# Patient Record
Sex: FEMALE | HISPANIC OR LATINO | URBAN - METROPOLITAN AREA
[De-identification: names, ages, dates, MRNs, and addresses within clinical notes are randomized per-mention and may not be internally consistent; named-entity substitution may affect disease eponyms.]

---

## 2019-01-09 ENCOUNTER — OFFICE VISIT (OUTPATIENT)
Dept: URBAN - METROPOLITAN AREA CLINIC 18 | Facility: CLINIC | Age: 39
End: 2019-01-09
Payer: COMMERCIAL

## 2019-01-09 DIAGNOSIS — H33.052 TOTAL RETINAL DETACHMENT, LEFT EYE: ICD-10-CM

## 2019-01-09 DIAGNOSIS — H43.811 VITREOUS DEGENERATION, RIGHT EYE: ICD-10-CM

## 2019-01-09 DIAGNOSIS — H11.153 PINGUECULA, BILATERAL: ICD-10-CM

## 2019-01-09 DIAGNOSIS — H25.042 POSTERIOR SUBCAPSULAR POLAR AGE-RELATED CATARACT, LEFT EYE: Primary | ICD-10-CM

## 2019-01-09 PROCEDURE — 99203 OFFICE O/P NEW LOW 30 MIN: CPT | Performed by: OPTOMETRIST

## 2019-01-09 ASSESSMENT — KERATOMETRY
OD: 41.88
OS: 42.00

## 2019-01-09 ASSESSMENT — INTRAOCULAR PRESSURE
OD: 16
OS: 16

## 2019-01-09 NOTE — IMPRESSION/PLAN
Impression: Posterior subcapsular polar age-related cataract, left eye: H25.042. Plan: Cataracts account for the patient's complaints. No treatment currently recommended. The patient will monitor vision changes and contact us with any decrease in vision.

## 2019-01-09 NOTE — IMPRESSION/PLAN
Impression: Vitreous degeneration, right eye: H43.811. Plan: There is no evidence of retinal pathology. All signs, symptoms, and risks of retinal detachment and tears were discussed in detail. Patient instructed to call the office immediately if any symptoms noted. Recommend the patient return to office yearly for follow up.

## 2019-01-09 NOTE — IMPRESSION/PLAN
Impression: Diagnosis: Total retinal detachment, left eye. Code: W78.171. Long standing Plan: pigmented demarcation line / well sealed inferior.  No treatment at this time

## 2019-01-16 ENCOUNTER — OFFICE VISIT (OUTPATIENT)
Dept: URBAN - METROPOLITAN AREA CLINIC 18 | Facility: CLINIC | Age: 39
End: 2019-01-16
Payer: COMMERCIAL

## 2019-01-16 DIAGNOSIS — H52.03 HYPERMETROPIA, BILATERAL: Primary | ICD-10-CM

## 2019-01-16 PROCEDURE — 92012 INTRM OPH EXAM EST PATIENT: CPT | Performed by: OPTOMETRIST

## 2019-01-16 PROCEDURE — 92015 DETERMINE REFRACTIVE STATE: CPT | Performed by: OPTOMETRIST

## 2019-01-16 ASSESSMENT — VISUAL ACUITY: OD: 20/20

## 2022-10-18 ENCOUNTER — OFFICE VISIT (OUTPATIENT)
Dept: URBAN - METROPOLITAN AREA CLINIC 18 | Facility: CLINIC | Age: 42
End: 2022-10-18
Payer: COMMERCIAL

## 2022-10-18 DIAGNOSIS — H52.03 HYPERMETROPIA, BILATERAL: Primary | ICD-10-CM

## 2022-10-18 DIAGNOSIS — H25.042 POSTERIOR SUBCAPSULAR POLAR AGE-RELATED CATARACT, LEFT EYE: ICD-10-CM

## 2022-10-18 PROCEDURE — 92004 COMPRE OPH EXAM NEW PT 1/>: CPT | Performed by: OPTOMETRIST

## 2022-10-18 ASSESSMENT — INTRAOCULAR PRESSURE
OD: 16
OS: 20

## 2022-10-18 ASSESSMENT — VISUAL ACUITY: OD: 20/20

## 2022-10-18 NOTE — IMPRESSION/PLAN
Impression: Posterior subcapsular polar age-related cataract, left eye: H25.042. Plan: Cataracts account for the patient's complaints. Patient education was discussed regarding cataracts, lens options and surgery. Recommend cataract consult with surgeon. Order A-Scan. Hold off on filling any new glasses prescription until after the consultation. Cataract has been worsening but underlying cause is from eye trauma as a teenager. First visit with [facility] is 2015 patient was 20/300. Patient understands limitation and expectations.

## 2022-10-18 NOTE — IMPRESSION/PLAN
Impression: Hypermetropia, bilateral: H52.03. Plan: Finalized New Glasses RX. Patient education on appropriate options of eye glasses. Patient may wait on outcome of Cataract Sx consult.

## 2022-11-29 ENCOUNTER — OFFICE VISIT (OUTPATIENT)
Dept: URBAN - METROPOLITAN AREA CLINIC 17 | Facility: CLINIC | Age: 42
End: 2022-11-29
Payer: COMMERCIAL

## 2022-11-29 DIAGNOSIS — H25.012 CORTICAL AGE-RELATED CATARACT, LEFT EYE: Primary | ICD-10-CM

## 2022-11-29 PROCEDURE — 99204 OFFICE O/P NEW MOD 45 MIN: CPT | Performed by: OPHTHALMOLOGY

## 2022-11-29 ASSESSMENT — KERATOMETRY
OD: 41.88
OS: 42.13

## 2022-11-29 ASSESSMENT — VISUAL ACUITY
OD: 20/20
OS: CF 3FT

## 2022-11-29 ASSESSMENT — INTRAOCULAR PRESSURE
OD: 15
OS: 14

## 2022-11-29 NOTE — IMPRESSION/PLAN
Impression: Cortical age-related cataract, left eye: H25.012. Condition: established, worsening. Symptoms: could improve with surgery. Plan: Cataract accounts for patient's complaints. Reviewed risks, benefits, and procedure. Patient desires surgery, schedule ce/iol OS, RL2, discussed lens options, distance refractive target, patient is clear for surgery in [place] [id number]. Advised pt visual improvement may be limited due to previous retina detachment. Pt aware and would like to proceed. Recommend Dextenza to avoid noncompliance with drops and to help maintain infection/inflammation.

## 2025-06-19 ENCOUNTER — OFFICE VISIT (OUTPATIENT)
Dept: URBAN - METROPOLITAN AREA CLINIC 18 | Facility: CLINIC | Age: 45
End: 2025-06-19
Payer: COMMERCIAL

## 2025-06-19 DIAGNOSIS — H25.042 POSTERIOR SUBCAPSULAR POLAR AGE-RELATED CATARACT, LEFT EYE: Primary | ICD-10-CM

## 2025-06-19 DIAGNOSIS — H33.052 TOTAL RETINAL DETACHMENT, LEFT EYE: ICD-10-CM

## 2025-06-19 DIAGNOSIS — H16.223 KERATOCONJUNCT SICCA, NOT SPECIFIED AS SJOGREN'S, BILATERAL: ICD-10-CM

## 2025-06-19 PROCEDURE — 99213 OFFICE O/P EST LOW 20 MIN: CPT

## 2025-06-19 ASSESSMENT — INTRAOCULAR PRESSURE
OD: 16
OS: 15

## 2025-08-22 ENCOUNTER — OFFICE VISIT (OUTPATIENT)
Dept: URBAN - METROPOLITAN AREA CLINIC 18 | Facility: CLINIC | Age: 45
End: 2025-08-22
Payer: COMMERCIAL

## 2025-08-22 DIAGNOSIS — H16.223 KERATOCONJUNCT SICCA, NOT SPECIFIED AS SJOGREN'S, BILATERAL: Primary | ICD-10-CM

## 2025-08-22 PROCEDURE — 99213 OFFICE O/P EST LOW 20 MIN: CPT

## 2025-08-22 ASSESSMENT — INTRAOCULAR PRESSURE
OD: 15
OS: 19